# Patient Record
Sex: FEMALE | Race: WHITE | Employment: OTHER | URBAN - METROPOLITAN AREA
[De-identification: names, ages, dates, MRNs, and addresses within clinical notes are randomized per-mention and may not be internally consistent; named-entity substitution may affect disease eponyms.]

---

## 2018-05-25 ENCOUNTER — APPOINTMENT (OUTPATIENT)
Dept: CT IMAGING | Facility: CLINIC | Age: 68
End: 2018-05-25
Attending: EMERGENCY MEDICINE
Payer: MEDICARE

## 2018-05-25 ENCOUNTER — HOSPITAL ENCOUNTER (EMERGENCY)
Facility: CLINIC | Age: 68
Discharge: HOME | End: 2018-05-25
Attending: EMERGENCY MEDICINE | Admitting: EMERGENCY MEDICINE
Payer: MEDICARE

## 2018-05-25 VITALS
DIASTOLIC BLOOD PRESSURE: 78 MMHG | SYSTOLIC BLOOD PRESSURE: 152 MMHG | TEMPERATURE: 98.2 F | OXYGEN SATURATION: 97 % | HEART RATE: 63 BPM | RESPIRATION RATE: 16 BRPM | WEIGHT: 229 LBS

## 2018-05-25 DIAGNOSIS — H81.12 BENIGN PAROXYSMAL POSITIONAL VERTIGO OF LEFT EAR: ICD-10-CM

## 2018-05-25 LAB
ALBUMIN UR-MCNC: NEGATIVE MG/DL
ANION GAP SERPL CALCULATED.3IONS-SCNC: 5 MMOL/L (ref 3–14)
APPEARANCE UR: CLEAR
BACTERIA #/AREA URNS HPF: ABNORMAL /HPF
BASOPHILS # BLD AUTO: 0 10E9/L (ref 0–0.2)
BASOPHILS NFR BLD AUTO: 0.3 %
BILIRUB UR QL STRIP: NEGATIVE
BUN SERPL-MCNC: 17 MG/DL (ref 7–30)
CALCIUM SERPL-MCNC: 8.6 MG/DL (ref 8.5–10.1)
CHLORIDE SERPL-SCNC: 107 MMOL/L (ref 94–109)
CO2 SERPL-SCNC: 29 MMOL/L (ref 20–32)
COLOR UR AUTO: ABNORMAL
CREAT SERPL-MCNC: 0.68 MG/DL (ref 0.52–1.04)
DIFFERENTIAL METHOD BLD: NORMAL
EOSINOPHIL # BLD AUTO: 0.2 10E9/L (ref 0–0.7)
EOSINOPHIL NFR BLD AUTO: 1.4 %
ERYTHROCYTE [DISTWIDTH] IN BLOOD BY AUTOMATED COUNT: 14.6 % (ref 10–15)
GFR SERPL CREATININE-BSD FRML MDRD: 86 ML/MIN/1.7M2
GLUCOSE SERPL-MCNC: 110 MG/DL (ref 70–99)
GLUCOSE UR STRIP-MCNC: NEGATIVE MG/DL
HCT VFR BLD AUTO: 43.6 % (ref 35–47)
HGB BLD-MCNC: 13.8 G/DL (ref 11.7–15.7)
HGB UR QL STRIP: NEGATIVE
IMM GRANULOCYTES # BLD: 0 10E9/L (ref 0–0.4)
IMM GRANULOCYTES NFR BLD: 0.3 %
INTERPRETATION ECG - MUSE: NORMAL
KETONES UR STRIP-MCNC: NEGATIVE MG/DL
LEUKOCYTE ESTERASE UR QL STRIP: ABNORMAL
LYMPHOCYTES # BLD AUTO: 3.2 10E9/L (ref 0.8–5.3)
LYMPHOCYTES NFR BLD AUTO: 30.3 %
MCH RBC QN AUTO: 27.7 PG (ref 26.5–33)
MCHC RBC AUTO-ENTMCNC: 31.7 G/DL (ref 31.5–36.5)
MCV RBC AUTO: 88 FL (ref 78–100)
MONOCYTES # BLD AUTO: 0.7 10E9/L (ref 0–1.3)
MONOCYTES NFR BLD AUTO: 6.3 %
MUCOUS THREADS #/AREA URNS LPF: PRESENT /LPF
NEUTROPHILS # BLD AUTO: 6.4 10E9/L (ref 1.6–8.3)
NEUTROPHILS NFR BLD AUTO: 61.4 %
NITRATE UR QL: NEGATIVE
NRBC # BLD AUTO: 0 10*3/UL
NRBC BLD AUTO-RTO: 0 /100
PH UR STRIP: 7 PH (ref 5–7)
PLATELET # BLD AUTO: 200 10E9/L (ref 150–450)
POTASSIUM SERPL-SCNC: 4.9 MMOL/L (ref 3.4–5.3)
RBC # BLD AUTO: 4.98 10E12/L (ref 3.8–5.2)
RBC #/AREA URNS AUTO: <1 /HPF (ref 0–2)
SODIUM SERPL-SCNC: 141 MMOL/L (ref 133–144)
SOURCE: ABNORMAL
SP GR UR STRIP: 1 (ref 1–1.03)
SQUAMOUS #/AREA URNS AUTO: 1 /HPF (ref 0–1)
UROBILINOGEN UR STRIP-MCNC: NORMAL MG/DL (ref 0–2)
WBC # BLD AUTO: 10.4 10E9/L (ref 4–11)
WBC #/AREA URNS AUTO: 4 /HPF (ref 0–5)

## 2018-05-25 PROCEDURE — 25000128 H RX IP 250 OP 636: Performed by: EMERGENCY MEDICINE

## 2018-05-25 PROCEDURE — 87086 URINE CULTURE/COLONY COUNT: CPT | Performed by: EMERGENCY MEDICINE

## 2018-05-25 PROCEDURE — 85025 COMPLETE CBC W/AUTO DIFF WBC: CPT | Performed by: EMERGENCY MEDICINE

## 2018-05-25 PROCEDURE — 25000132 ZZH RX MED GY IP 250 OP 250 PS 637: Performed by: EMERGENCY MEDICINE

## 2018-05-25 PROCEDURE — 81001 URINALYSIS AUTO W/SCOPE: CPT | Performed by: EMERGENCY MEDICINE

## 2018-05-25 PROCEDURE — 80048 BASIC METABOLIC PNL TOTAL CA: CPT | Performed by: EMERGENCY MEDICINE

## 2018-05-25 PROCEDURE — 93005 ELECTROCARDIOGRAM TRACING: CPT

## 2018-05-25 PROCEDURE — 70450 CT HEAD/BRAIN W/O DYE: CPT

## 2018-05-25 PROCEDURE — 96360 HYDRATION IV INFUSION INIT: CPT

## 2018-05-25 PROCEDURE — 51798 US URINE CAPACITY MEASURE: CPT

## 2018-05-25 PROCEDURE — 87186 SC STD MICRODIL/AGAR DIL: CPT | Performed by: EMERGENCY MEDICINE

## 2018-05-25 PROCEDURE — 99285 EMERGENCY DEPT VISIT HI MDM: CPT | Mod: 25

## 2018-05-25 PROCEDURE — 25000131 ZZH RX MED GY IP 250 OP 636 PS 637: Performed by: EMERGENCY MEDICINE

## 2018-05-25 PROCEDURE — 87088 URINE BACTERIA CULTURE: CPT | Performed by: EMERGENCY MEDICINE

## 2018-05-25 RX ORDER — MECLIZINE HYDROCHLORIDE 25 MG/1
25 TABLET ORAL ONCE
Status: COMPLETED | OUTPATIENT
Start: 2018-05-25 | End: 2018-05-25

## 2018-05-25 RX ORDER — LORAZEPAM 0.5 MG/1
0.5 TABLET ORAL ONCE
Status: COMPLETED | OUTPATIENT
Start: 2018-05-25 | End: 2018-05-25

## 2018-05-25 RX ORDER — MECLIZINE HYDROCHLORIDE 25 MG/1
25 TABLET ORAL EVERY 6 HOURS PRN
Qty: 30 TABLET | Refills: 1 | Status: SHIPPED | OUTPATIENT
Start: 2018-05-25

## 2018-05-25 RX ORDER — SODIUM CHLORIDE, SODIUM LACTATE, POTASSIUM CHLORIDE, CALCIUM CHLORIDE 600; 310; 30; 20 MG/100ML; MG/100ML; MG/100ML; MG/100ML
1000 INJECTION, SOLUTION INTRAVENOUS CONTINUOUS
Status: DISCONTINUED | OUTPATIENT
Start: 2018-05-25 | End: 2018-05-25 | Stop reason: HOSPADM

## 2018-05-25 RX ADMIN — LORAZEPAM 0.5 MG: 0.5 TABLET ORAL at 08:24

## 2018-05-25 RX ADMIN — MECLIZINE HYDROCHLORIDE 25 MG: 25 TABLET ORAL at 08:24

## 2018-05-25 RX ADMIN — SODIUM CHLORIDE, POTASSIUM CHLORIDE, SODIUM LACTATE AND CALCIUM CHLORIDE 1000 ML: 600; 310; 30; 20 INJECTION, SOLUTION INTRAVENOUS at 08:25

## 2018-05-25 ASSESSMENT — ENCOUNTER SYMPTOMS
TROUBLE SWALLOWING: 0
FREQUENCY: 1
WEAKNESS: 0
HEADACHES: 1
FEVER: 0
NUMBNESS: 0
DIZZINESS: 1
GASTROINTESTINAL NEGATIVE: 1
DYSURIA: 1
SPEECH DIFFICULTY: 0

## 2018-05-25 NOTE — ED PROVIDER NOTES
History     Chief Complaint:  Dizziness     HPI   History obtained through granddaughter acting as informal .  Lorraine Guillaume is a 68 year old female who presents with granddaughter for evaluation of dizziness. The patient reports a four day history of dysuria and urinary frequency. Today when she awoke around 0600 she reports acute onset of vertiginous room-spinning dizziness as well as an posterior headache. She notes some mild gait difficulty due to imbalance with this. Dizziness is worse with changing positions or turning to the left. No speech or swallowing difficulties. The patient denies any visual deficit, diplopia, fevers, focal numbness or weakness, or any other acute symptoms.     Of note, she reports a history of similar dizziness many years ago for which she was not worked up, and also reports a stress-related stroke in 1999 which was also not worked up and from which she has no residual effects.       Allergies:  Morphine  Penicillins    Medications:    The patient is currently on no regular medications.    Past Medical History:    CVA in 1999, per patient    Past Surgical History:    Appendectomy     Family History:    History reviewed. No pertinent family history.      Social History:  The patient denies tobacco, alcohol, or drug use.     Here with granddaughter, acting as informal .        Review of Systems   Constitutional: Negative for fever.   HENT: Negative for trouble swallowing.    Eyes: Negative for visual disturbance.   Gastrointestinal: Negative.    Genitourinary: Positive for dysuria and frequency.   Musculoskeletal: Positive for gait problem.   Neurological: Positive for dizziness and headaches. Negative for speech difficulty, weakness and numbness.   All other systems reviewed and are negative.      Physical Exam   First Vitals:  BP: 168/83  Pulse: 86  Heart Rate: 86  Temp: 98.2  F (36.8  C)  Resp: 14  Weight: 103.9 kg (229 lb)  SpO2: 99  %    Physical Exam  GENERAL: well developed. Unable to lay flat. Crying as I enter the room. Eyes closed.   HEAD: atraumatic  EYES: pupils reactive, extraocular muscles intact, conjunctivae normal  ENT:  mucus membranes moist  NECK:  trachea midline, normal range of motion  RESPIRATORY: no tachypnea, breath sounds clear to auscultation   CVS: normal S1/S2, no murmurs, intact distal pulses  ABDOMEN: soft, nontender, nondistention  MUSCULOSKELETAL: no deformities  SKIN: warm and dry, no acute rashes or ulceration  NEURO: GCS 15, cranial nerves intact, alert and oriented x3. Finger-nose-finger testing is slightly ataxic bilaterally.  PSYCH:  Mood/affect normal        Emergency Department Course   ECG (10:19:54):  Indication: dizziness   Rate 62 bpm. CO interval 164. QRS duration 84. QT/QTc 426/436. P-R-T axes 23, 13, 28.   Normal sinus rhythm  Possible anterior infarct, age undetermined  abnormal ECG  No previous ECGs available for comparison.  Interpreted at 1026 by Nomi Peañ MD.     Imaging:  Radiographic findings were communicated with the patient and family who voiced understanding of the findings.  CT Head w/o contrast:  No evidence of acute intracranial hemorrhage, mass, or herniation. Results per Radiology.      Laboratory:  Laboratory findings were communicated with the patient and family who voiced understanding of the findings.  CBC w/diff: WNL (WBC 10.4, Hgb 13.8, Plt 200)  CMP: Glu 110 (H), o/w WNL (Cr 0.68)  UA: trace leukocyte esterase, few bacteria, mucous present, 4 WBCs, o/w Negative   Urine culture: pending     Interventions:  0824: meclizine 25mg, PO  0824: lorazepam 0.5mg, PO  0825: lactated ringers bolus 1L, IV      Emergency Department Course:  Past medical records, nursing notes, and vitals reviewed.   0750: I performed an exam of the patient as documented above.    Peripheral IV access established. Blood drawn and sent. The above imaging study and labs were obtained. Results above.  The  patient was given the above interventions with improvement.    0900: I rechecked patient.    1030: I rechecked patient, who is much improved.     I discussed the treatment plan with the patient. She expressed understanding of this plan and consented to discharge. She will be discharged home with instructions for care and follow up. In addition, the patient will return to the emergency department if symptoms persist, worsen, if new symptoms arise or if there is any concern.  All questions were answered.      Impression & Plan    Medical Decision Making:   The patient presents with urinary symptoms as well as dizziness. The dizziness came on abruptly and is worse turning to the left. Certainly stroke, benign positional vertigo, amongst others were all considered. The patient seemed to be quite symptomatic with it. She was given 0.5mg of Ativan and meclizine. Repeat exam is improved. She has a normal neurologic exam. This did come on suddenly. Imaging was obtained as she has some slight posterior aspect headache, but again the history is difficult to gather as she was so symptomatic with the dizziness. Do not suspect stroke or dissection. Do not feel she needs an MRI at this time. The patient also having urinary symptoms. UA does not show any signs of infection. We did to a bladder scan which was normal. Will add on a culture but no findings on the UA that would warrant antibiotics or treatment at this time.     Diagnosis:    ICD-10-CM    1. Benign paroxysmal positional vertigo of left ear H81.12        Disposition:   discharged to home    Discharge Medications:  Discharge Medication List as of 5/25/2018 10:32 AM      START taking these medications    Details   meclizine (ANTIVERT) 25 MG tablet Take 1 tablet (25 mg) by mouth every 6 hours as needed for dizziness, Disp-30 tablet, R-1, Local Print             Scribe Disclosure:  Ruddy CARTER, am serving as a scribe at 7:47 AM on 5/25/2018 to document services  personally performed by Nomi Peña MD based on my observations and the provider's statements to me.    Ruddy Molina  5/25/2018   EMERGENCY DEPARTMENT      Nomi Peña MD  05/29/18 2007

## 2018-05-25 NOTE — ED NOTES
Bed: ED20  Expected date: 5/25/18  Expected time: 7:29 AM  Means of arrival:   Comments:  Triage

## 2018-05-25 NOTE — ED AVS SNAPSHOT
Emergency Department    [address] [place] [address]    Phone:  [phone]    Fax:  [phone]                                       Lorraine Guillaume   MRN: [id number]    Department:   Emergency Department   Date of Visit:  5/25/2018           After Visit Summary Signature Page     I have received my discharge instructions, and my questions have been answered. I have discussed any challenges I see with this plan with the nurse or doctor.    ..........................................................................................................................................  Patient/Patient Representative Signature      ..........................................................................................................................................  Patient Representative Print Name and Relationship to Patient    ..................................................               ................................................  Date                                            Time    ..........................................................................................................................................  Reviewed by Signature/Title    ...................................................              ..............................................  Date                                                            Time

## 2018-05-25 NOTE — ED AVS SNAPSHOT
Emergency Department    [address] [facility] [address]    Phone:  [phone]    Fax:  [phone]                                       Lorraine Guillaume   MRN: [id number]    Department:   Emergency Department   Date of Visit:  5/25/2018           Patient Information     Date Of Birth          1950        Your diagnoses for this visit were:     Benign paroxysmal positional vertigo of left ear        You were seen by Nomi Peña MD.      Follow-up Information     Follow up with your primary MD.        Discharge Instructions         Vértigo posicional paroxístico jerman     Bruner proveedor de atención médica puede moverle la felix de diferentes maneras para tratar bruner VPPB.     El vértigo posicional paroxístico jerman ( BPPV , por heather siglas en inglés) es un problema del oído interno. El oído interno contiene el sistema vestibular. Chelsie sistema es el que ayuda a mantener bruner equilibrio. El vértigo posicional paroxístico jerman causa gabrielle sensación de blanca vueltas. Es un problema común del sistema vestibular.   Qué es el sistema vestibular?  El sistema vestibular del oído está formado por partes muy pequeñas. Estas son, entre otras, el utrículo, el sáculo y los cristobal semicirculares. El utrículo es un órgano muy pequeño que contiene courtney de calcio. En algunas personas, los courtney se trasladan y entran en los cristobal semicirculares. Cuando ocurre esto, el sistema leyda de funcionar eleazar. Eso causa el vértigo posicional paroxístico jerman. [place] significa que es gabrielle afección que no pone en riesgo la chelle. Paroxístico quiere decir que se presenta de repente. Posicional se refiere a que se presenta cuando usted mueve bruner felix. El vértigo es gabrielle sensación de estar dando vueltas.   Cuáles son las causas del vértigo posicional paroxístico jerman?  Las causas incluyen lesiones de la felix o el ravinder. Otros problemas del sistema vestibular también pueden causar chelsie tipo de vértigo.  En muchos casos, la causa se desconoce.  Síntomas del vértigo posicional paroxístico jerman  Usted puede experimentar sensaciones repetitivas de estar dando vueltas (vértigo) El vértigo suele durar menos de un minuto. Algunos movimientos, norma rodar en la cama, pueden provocar vértigo.  Diagnóstico del vértigo posicional paroxístico jerman  Hays proveedor de atención médica primaria puede diagnosticar y tratarle esta afección. También puede consultar a un médico especialista en nariz, amandeepta y oído (otorrinolaringólogo). En algunos casos, es posible que tenga que consultar a un médico especialista en el sistema nervioso (neurólogo).  El proveedor de atención médica le preguntará sobre heather síntomas e historia clínica (antecedentes de chrystal). Chelsie médico le examinará. Es posible que le joann pruebas de audición y equilibrio. [place] parte del examen, hays proveedor de atención médica puede hacerle  hays felix y cuerpo de determinadas maneras. Si usted tiene vértigo posicional paroxístico jerman, esos movimientos pueden provocarle vértigo. Hays proveedor también buscará detectar movimientos anormales de heather ojos. Es posible que le joann otras pruebas para comprobar el estado de heather sistemas nervioso o vestibular.  Tratamiento del vértigo posicional paroxístico jerman  Hays proveedor de atención médica puede intentar  los courtney de calcio. Le hará  hays felix y ravinder de ciertas maneras. Chelsie tratamiento es seguro y generalmente funciona eleazar. También es posible que le pidan que majo estos movimientos en hays casa. Es posible que siga teniendo vértigo por algunas semanas. Hays proveedor de atención médica revisará de nuevo heather síntomas, generalmente en un mes. Es posible que el tratamiento incluya gabrielle fisioterapia especial. En casos infrecuentes, es posible que se necesite gabrielle cirugía si el vértigo posicional paroxístico jerman no se va.     Cuándo llamar al proveedor de atención médica  Llame enseguida a hays  proveedor de atención médica si tiene alguno de los siguientes síntomas:    Si tiene síntomas que no se alivian con el tratamiento    Los síntomas empeoran    Aparecen síntomas nuevos   Date Last Reviewed: 3/19/2015    7489-1648 [employer]. [address] [place] [address]. All rights reserved. This information is not intended as a substitute for professional medical care. Always follow your healthcare professional's instructions.          24 Hour Appointment Hotline       To make an appointment at any [facility], call [phone] ([phone]). If you don't have a family doctor or clinic, we will help you find one. [facility] are conveniently located to serve the needs of you and your family.             Review of your medicines      START taking        Dose / Directions Last dose taken    meclizine 25 MG tablet   Commonly known as:  ANTIVERT   Dose:  25 mg   Quantity:  30 tablet        Take 1 tablet (25 mg) by mouth every 6 hours as needed for dizziness   Refills:  1                Prescriptions were sent or printed at these locations (1 Prescription)                   Other Prescriptions                Printed at Department/Unit printer (1 of 1)         meclizine (ANTIVERT) 25 MG tablet                Procedures and tests performed during your visit     Basic metabolic panel    CBC with platelets differential    CT Head w/o Contrast    EKG 12 lead    UA reflex to Microscopic and Culture      Orders Needing Specimen Collection     None      Pending Results     Date and Time Order Name Status Description    5/25/2018 0742 EKG 12 lead Preliminary             Pending Culture Results     No orders found from 5/23/2018 to 5/26/2018.            Pending Results Instructions     If you had any lab results that were not finalized at the time of your Discharge, you can call the ED Lab Result RN at [phone]. You will be contacted by this team for any positive Lab results or  changes in treatment. The nurses are available 7 days a week from 10A to 6:30P.  You can leave a message 24 hours per day and they will return your call.        Test Results From Your Hospital Stay        5/25/2018  8:49 AM      Component Results     Component Value Ref Range & Units Status    WBC 10.4 4.0 - 11.0 10e9/L Final    RBC Count 4.98 3.8 - 5.2 10e12/L Final    Hemoglobin 13.8 11.7 - 15.7 g/dL Final    Hematocrit 43.6 35.0 - 47.0 % Final    MCV 88 78 - 100 fl Final    MCH 27.7 26.5 - 33.0 pg Final    MCHC 31.7 31.5 - 36.5 g/dL Final    RDW 14.6 10.0 - 15.0 % Final    Platelet Count 200 150 - 450 10e9/L Final    Diff Method Automated Method  Final    % Neutrophils 61.4 % Final    % Lymphocytes 30.3 % Final    % Monocytes 6.3 % Final    % Eosinophils 1.4 % Final    % Basophils 0.3 % Final    % Immature Granulocytes 0.3 % Final    Nucleated RBCs 0 0 /100 Final    Absolute Neutrophil 6.4 1.6 - 8.3 10e9/L Final    Absolute Lymphocytes 3.2 0.8 - 5.3 10e9/L Final    Absolute Monocytes 0.7 0.0 - 1.3 10e9/L Final    Absolute Eosinophils 0.2 0.0 - 0.7 10e9/L Final    Absolute Basophils 0.0 0.0 - 0.2 10e9/L Final    Abs Immature Granulocytes 0.0 0 - 0.4 10e9/L Final    Absolute Nucleated RBC 0.0  Final         5/25/2018  9:01 AM      Component Results     Component Value Ref Range & Units Status    Sodium 141 133 - 144 mmol/L Final    Potassium 4.9 3.4 - 5.3 mmol/L Final    Chloride 107 94 - 109 mmol/L Final    Carbon Dioxide 29 20 - 32 mmol/L Final    Anion Gap 5 3 - 14 mmol/L Final    Glucose 110 (H) 70 - 99 mg/dL Final    Urea Nitrogen 17 7 - 30 mg/dL Final    Creatinine 0.68 0.52 - 1.04 mg/dL Final    GFR Estimate 86 >60 mL/min/1.7m2 Final    Non  GFR Calc    GFR Estimate If Black >90 >60 mL/min/1.7m2 Final    African American GFR Calc    Calcium 8.6 8.5 - 10.1 mg/dL Final         5/25/2018  8:51 AM      Component Results     Component Value Ref Range & Units Status    Color Urine Straw  Final     Appearance Urine Clear  Final    Glucose Urine Negative NEG^Negative mg/dL Final    Bilirubin Urine Negative NEG^Negative Final    Ketones Urine Negative NEG^Negative mg/dL Final    Specific Gravity Urine 1.004 1.003 - 1.035 Final    Blood Urine Negative NEG^Negative Final    pH Urine 7.0 5.0 - 7.0 pH Final    Protein Albumin Urine Negative NEG^Negative mg/dL Final    Urobilinogen mg/dL Normal 0.0 - 2.0 mg/dL Final    Nitrite Urine Negative NEG^Negative Final    Leukocyte Esterase Urine Trace (A) NEG^Negative Final    Source Midstream Urine  Final    RBC Urine <1 0 - 2 /HPF Final    WBC Urine 4 0 - 5 /HPF Final    Bacteria Urine Few (A) NEG^Negative /HPF Final    Squamous Epithelial /HPF Urine 1 0 - 1 /HPF Final    Mucous Urine Present (A) NEG^Negative /LPF Final         5/25/2018 10:13 AM      Narrative     CT SCAN OF THE HEAD WITHOUT CONTRAST   5/25/2018 9:28 AM     HISTORY: Vertigo.     TECHNIQUE:  Axial images of the head and coronal reformations without  IV contrast material. Radiation dose for this scan was reduced using  automated exposure control, adjustment of the mA and/or kV according  to patient size, or iterative reconstruction technique.    COMPARISON: None.    FINDINGS: There is no evidence of intracranial hemorrhage, mass, acute  infarct or anomaly. The ventricles are normal in size, shape and  configuration. The brain parenchyma and subarachnoid spaces are  normal.     The visualized portions of the sinuses and mastoids appear normal. The  bony calvarium and bones of the skull base appear intact.         Impression     IMPRESSION: No evidence of acute intracranial hemorrhage, mass, or  herniation.      MALICK PÉREZ MD                Clinical Quality Measure: Blood Pressure Screening     Your blood pressure was checked while you were in the emergency department today. The last reading we obtained was  BP: 152/78 . Please read the guidelines below about what these numbers mean and what you should do  "about them.  If your systolic blood pressure (the top number) is less than 120 and your diastolic blood pressure (the bottom number) is less than 80, then your blood pressure is normal. There is nothing more that you need to do about it.  If your systolic blood pressure (the top number) is 120-139 or your diastolic blood pressure (the bottom number) is 80-89, your blood pressure may be higher than it should be. You should have your blood pressure rechecked within a year by a primary care provider.  If your systolic blood pressure (the top number) is 140 or greater or your diastolic blood pressure (the bottom number) is 90 or greater, you may have high blood pressure. High blood pressure is treatable, but if left untreated over time it can put you at risk for heart attack, stroke, or kidney failure. You should have your blood pressure rechecked by a primary care provider within the next 4 weeks.  If your provider in the emergency department today gave you specific instructions to follow-up with your doctor or provider even sooner than that, you should follow that instruction and not wait for up to 4 weeks for your follow-up visit.        Thank you for choosing [facility]       Thank you for choosing [facility] for your care. Our goal is always to provide you with excellent care. Hearing back from our patients is one way we can continue to improve our services. Please take a few minutes to complete the written survey that you may receive in the mail after you visit with us. Thank you!        [employer] Information     [email or web address] lets you send messages to your doctor, view your test results, renew your prescriptions, schedule appointments and more. To sign up, go to [email or web address] . Click on \"Log in\" on the left side of the screen, which will take you to the Welcome page. Then click on \"Sign up Now\" on the right side of the page.     You will be asked to enter the access code listed below, as well as some personal " information. Please follow the directions to create your username and password.     Your access code is: [email or web address]  Expires: 2018 10:32 AM     Your access code will  in 90 days. If you need help or a new code, please call your [place] clinic or [phone].        Care EveryWhere ID     This is your Care EveryWhere ID. This could be used by other organizations to access your [place] medical records  [id number]        Equal Access to Services     [facility] : Panda dumonto Soyusuf, waaxda luqadaha, qaybta kaalmada adeedyajanet, marcela chandler . So [facility] [phone].    ATENCIÓN: Si habla español, tiene a hays disposición servicios gratuitos de asistencia lingüística. Llame al [phone].    We comply with applicable federal civil rights laws and [place] laws. We do not discriminate on the basis of race, color, national origin, age, disability, sex, sexual orientation, or gender identity.            After Visit Summary       This is your record. Keep this with you and show to your community pharmacist(s) and doctor(s) at your next visit.

## 2018-05-25 NOTE — DISCHARGE INSTRUCTIONS
Vértigo posicional paroxístico jerman     Hays proveedor de atención médica puede moverle la felix de diferentes maneras para tratar hays VPPB.     El vértigo posicional paroxístico jerman ( BPPV , por heather siglas en inglés) es un problema del oído interno. El oído interno contiene el sistema vestibular. Chelsie sistema es el que ayuda a mantener hays equilibrio. El vértigo posicional paroxístico jerman causa gabrielle sensación de blanca vueltas. Es un problema común del sistema vestibular.   Qué es el sistema vestibular?  El sistema vestibular del oído está formado por partes muy pequeñas. Estas son, entre otras, el utrículo, el sáculo y los cristobal semicirculares. El utrículo es un órgano muy pequeño que contiene courtney de calcio. En algunas personas, los courtney se trasladan y entran en los cristobal semicirculares. Cuando ocurre esto, el sistema leyda de funcionar eleazar. Eso causa el vértigo posicional paroxístico jerman. [place] significa que es gabrielle afección que no pone en riesgo la chelle. Paroxístico quiere decir que se presenta de repente. Posicional se refiere a que se presenta cuando usted mueve hays felix. El vértigo es gabrielle sensación de estar dando vueltas.   Cuáles son las causas del vértigo posicional paroxístico jerman?  Las causas incluyen lesiones de la felix o el ravinder. Otros problemas del sistema vestibular también pueden causar chelsie tipo de vértigo. En muchos casos, la causa se desconoce.  Síntomas del vértigo posicional paroxístico jerman  Usted puede experimentar sensaciones repetitivas de estar dando vueltas (vértigo) El vértigo suele durar menos de un minuto. Algunos movimientos, norma rodar en la cama, pueden provocar vértigo.  Diagnóstico del vértigo posicional paroxístico jerman  Hays proveedor de atención médica primaria puede diagnosticar y tratarle esta afección. También puede consultar a un médico especialista en nariz, garganta y oído (otorrinolaringólogo). En algunos casos, es posible que tenga que  consultar a un médico especialista en el sistema nervioso (neurólogo).  El proveedor de atención médica le preguntará sobre heather síntomas e historia clínica (antecedentes de chrystal). Chelsie médico le examinará. Es posible que le joann pruebas de audición y equilibrio. Simin parte del examen, hays proveedor de atención médica puede hacerle  hays felix y cuerpo de determinadas maneras. Si usted tiene vértigo posicional paroxístico jerman, esos movimientos pueden provocarle vértigo. Hays proveedor también buscará detectar movimientos anormales de heather ojos. Es posible que le joann otras pruebas para comprobar el estado de heather sistemas nervioso o vestibular.  Tratamiento del vértigo posicional paroxístico jerman  Hays proveedor de atención médica puede intentar  los courtney de calcio. Le hará  hays felix y ravinder de ciertas maneras. Chelsie tratamiento es seguro y generalmente funciona eleazar. También es posible que le pidan que majo estos movimientos en hays casa. Es posible que siga teniendo vértigo por algunas semanas. Hays proveedor de atención médica revisará de nuevo heather síntomas, generalmente en un mes. Es posible que el tratamiento incluya gabrielle fisioterapia especial. En casos infrecuentes, es posible que se necesite gabrielle cirugía si el vértigo posicional paroxístico jerman no se va.     Cuándo llamar al proveedor de atención médica  Llame enseguida a hays proveedor de atención médica si tiene alguno de los siguientes síntomas:    Si tiene síntomas que no se alivian con el tratamiento    Los síntomas empeoran    Aparecen síntomas nuevos   Date Last Reviewed: 3/19/2015    [id number] [employer]. [address] [place] [address], [place] [address]. All rights reserved. This information is not intended as a substitute for professional medical care. Always follow your healthcare professional's instructions.

## 2018-05-27 ENCOUNTER — TELEPHONE (OUTPATIENT)
Dept: EMERGENCY MEDICINE | Facility: CLINIC | Age: 68
End: 2018-05-27

## 2018-05-27 DIAGNOSIS — N39.0 URINARY TRACT INFECTION: ICD-10-CM

## 2018-05-27 LAB
BACTERIA SPEC CULT: ABNORMAL
Lab: ABNORMAL
SPECIMEN SOURCE: ABNORMAL

## 2018-05-27 RX ORDER — SULFAMETHOXAZOLE/TRIMETHOPRIM 800-160 MG
1 TABLET ORAL 2 TIMES DAILY
Qty: 6 TABLET | Refills: 0 | Status: CANCELLED | OUTPATIENT
Start: 2018-05-27 | End: 2018-05-30

## 2018-05-27 NOTE — TELEPHONE ENCOUNTER
[facility] Emergency Department Lab result notification [Adult-Female]    [place] ED lab result protocol used  Urine Culture Protocol    Reason for call  Notify of lab results, assess symptoms,  review ED providers recommendations/discharge instructions (if necessary) and advise per ED lab result f/u protocol    Lab Result (including Rx patient on, if applicable)  Preliminary urine culture report on 05/27/2018 shows the presence of bacteria(s):  10,000 to 50,000 colonies/ml Escherichia coli  Emergency Dept discharge antibiotic: None  Date of Rx (If Applicable): NA  Recommendations per [place] ED Lab result protocol - Urine culture protocol.  Information table from ED Provider visit on 05/25/2018  ED diagnosis Benign paroxysmal positional vertigo of left ear   ED provider Nomi Peña MD   Symptoms reported at ED visit (Chief complaint, HPI) History obtained through granddaughter acting as informal .  Lorraine Guillaume is a 68 year old female who presents with granddaughter for evaluation of dizziness. The patient reports a four day history of dysuria and urinary frequency. Today when she awoke around 0600 she reports acute onset of vertiginous room-spinning dizziness as well as an posterior headache. She notes some mild gait difficulty due to imbalance with this. Dizziness is worse with changing positions or turning to the left. No speech or swallowing difficulties. The patient denies any visual deficit, diplopia, fevers, focal numbness or weakness, or any other acute symptoms.      Of note, she reports a history of similar dizziness many years ago for which she was not worked up, and also reports a stress-related stroke in 1999 which was also not worked up and from which she has no residual effects.    ED providers Impression and Plan (applicable information) The patient presents with urinary symptoms as well as dizziness. The dizziness came on abruptly and is worse turning to the  left. Certainly stroke, benign positional vertigo, amongst others were all considered. The patient seemed to be quite symptomatic with it. She was given 0.5mg of Ativan and meclizine. Repeat exam is improved. She has a normal neurologic exam. This did come on suddenly. Imaging was obtained as she has some slight posterior aspect headache, but again the history is difficult to gather as she was so symptomatic with the dizziness. Do not suspect stroke or dissection. Do not feel she needs an MRI at this time. The patient also having urinary symptoms. UA does not show any signs of infection. We did to a bladder scan which was normal. Will add on a culture but no findings on the UA that would warrant antibiotics or treatment at this time.    Significant Medical hx, if applicable Reviewed   Coumadin/Warfarin [Yes /No] no   Creatinine Level (mg/dl) 0.68   Creatinine clearance (ml/min), if applicable 125   Allergies Morphine, Penillins   Weight, if applicable 103.8 kg      RN Assessment (Patient s current Symptoms), include time called.  [Insert Left message here if message left]  At 1252 with  Left voicemail message requesting a call back to [phone] between 10 a.m. and 6:30 p.m., 7 days a week     Cecilia Jackson RN  [facility] RN  Lung Nodule and ED Lab Result F/u RN  Epic pool (ED late result f/u RN): [id number]  FV INCIDENTAL RADIOLOGY F/U NURSES: P [id number]  Ph# [phone]    Copy of Lab result   Preliminary Result   Exam Information   Exam Date Exam Time Accession # Results    5/25/18  7:45 AM [id number]    Component Results   Component Collected Lab   Specimen Description 05/25/2018  7:45 AM 75   Midstream Urine   Special Requests 05/25/2018  7:45 AM 75   Specimen received in preservative   Culture Micro (Abnormal) 05/25/2018  7:45 AM 75   10,000 to 50,000 colonies/mL   Escherichia coli   Susceptibility testing in progress

## 2018-05-28 RX ORDER — CEPHALEXIN 500 MG/1
500 CAPSULE ORAL 2 TIMES DAILY
Qty: 14 CAPSULE | Refills: 0 | Status: SHIPPED | OUTPATIENT
Start: 2018-05-28 | End: 2018-06-04

## 2018-05-28 NOTE — TELEPHONE ENCOUNTER
[facility] Emergency Department Lab result notification:    [place] ED lab result protocol used  Urine Culture Protocol    Reason for call  Notify of lab results, assess symptoms,  review ED providers recommendations/discharge instructions (if necessary) and advise per ED lab result f/u protocol    Lab Result  Final urine culture on 05/28/2018 shows the presence of bacteria(s): 10,000 to 50,000 colonies/ml Escherichia coli  [place] ED discharge antibiotic: None  As per  ED lab result protocol, treat per Urine culture protocol.  Information table from ED Provider visit on 05/25/2018  ED diagnosis Benign paroxysmal positional vertigo of left ear   ED provider Nomi Peña MD   Symptoms reported at ED visit (Chief complaint, HPI) History obtained through granddaughter acting as informal .  Lorraine Guillaume is a 68 year old female who presents with granddaughter for evaluation of dizziness. The patient reports a four day history of dysuria and urinary frequency. Today when she awoke around 0600 she reports acute onset of vertiginous room-spinning dizziness as well as an posterior headache. She notes some mild gait difficulty due to imbalance with this. Dizziness is worse with changing positions or turning to the left. No speech or swallowing difficulties. The patient denies any visual deficit, diplopia, fevers, focal numbness or weakness, or any other acute symptoms.       Of note, she reports a history of similar dizziness many years ago for which she was not worked up, and also reports a stress-related stroke in 1999 which was also not worked up and from which she has no residual effects.    ED providers Impression and Plan (applicable information) The patient presents with urinary symptoms as well as dizziness. The dizziness came on abruptly and is worse turning to the left. Certainly stroke, benign positional vertigo, amongst others were all considered. The patient seemed to be  quite symptomatic with it. She was given 0.5mg of Ativan and meclizine. Repeat exam is improved. She has a normal neurologic exam. This did come on suddenly. Imaging was obtained as she has some slight posterior aspect headache, but again the history is difficult to gather as she was so symptomatic with the dizziness. Do not suspect stroke or dissection. Do not feel she needs an MRI at this time. The patient also having urinary symptoms. UA does not show any signs of infection. We did to a bladder scan which was normal. Will add on a culture but no findings on the UA that would warrant antibiotics or treatment at this time.    Significant Medical hx, if applicable Reviewed   Coumadin/Warfarin [Yes /No] no   Creatinine Level (mg/dl) 0.68   Creatinine clearance (ml/min), if applicable 125   Allergies Morphine, Penillins   Weight, if applicable 103.8 kg       RN Assessment (Patient s current Symptoms), include time called.  [Insert Left message here if message left]  At 1538 With  Left voicemail message requesting a call back to [phone] between 10 a.m. and 6:00 p.m., 7 days a week. May leave a message 24/7, if no one available.     Cecilia Jackson, RN  [facility] RN  Lung Nodule and ED Lab Result F/u RN  Epic pool (ED late result f/u RN): [id number]  FV INCIDENTAL RADIOLOGY F/U NURSES: P [id number]  Ph# [phone]      Copy of Lab result   Exam Information   Exam Date Exam Time Accession # Results    5/25/18  7:45 AM [id number]    Component Results   Component Collected Lab   Specimen Description 05/25/2018  7:45    Midstream Urine   Special Requests 05/25/2018  7:45 AM 75   Specimen received in preservative   Culture Micro (Abnormal) 05/25/2018  7:45    10,000 to 50,000 colonies/mL   Escherichia coli      Culture & Susceptibility   ESCHERICHIA COLI   Antibiotic Interpretation Sensitivity Unit Method Status   AMPICILLIN Resistant >=32 ug/mL JACOB Final   AMPICILLIN/SULBACTAM  Intermediate 16 ug/mL JACOB Final   CEFAZOLIN Sensitive <=4 ug/mL JACOB Final   Comment: Cefazolin JACOB breakpoints are for the treatment of uncomplicated urinary tract   infections.  For the treatment of systemic infections, please contact the   laboratory for additional testing.   CEFEPIME Sensitive <=1 ug/mL JACOB Final   CEFOXITIN Sensitive 8 ug/mL JACOB Final   CEFTAZIDIME Sensitive <=1 ug/mL JACOB Final   CEFTRIAXONE Sensitive <=1 ug/mL JACOB Final   CIPROFLOXACIN Sensitive <=0.25 ug/mL JACOB Final   GENTAMICIN Sensitive <=1 ug/mL JACOB Final   LEVOFLOXACIN Sensitive <=0.12 ug/mL JACOB Final   NITROFURANTOIN Sensitive <=16 ug/mL JACOB Final   Piperacillin/Tazo Sensitive <=4 ug/mL JACOB Final   TOBRAMYCIN Sensitive <=1 ug/mL JACOB Final   Trimethoprim/Sulfa Resistant >=16/304 ug/mL JACOB Final

## 2018-08-31 ENCOUNTER — APPOINTMENT (OUTPATIENT)
Dept: GENERAL RADIOLOGY | Facility: CLINIC | Age: 68
End: 2018-08-31
Attending: INTERNAL MEDICINE
Payer: MEDICARE

## 2018-08-31 ENCOUNTER — HOSPITAL ENCOUNTER (EMERGENCY)
Facility: CLINIC | Age: 68
Discharge: HOME | End: 2018-08-31
Attending: INTERNAL MEDICINE | Admitting: INTERNAL MEDICINE
Payer: MEDICARE

## 2018-08-31 VITALS
OXYGEN SATURATION: 93 % | DIASTOLIC BLOOD PRESSURE: 78 MMHG | TEMPERATURE: 98 F | SYSTOLIC BLOOD PRESSURE: 134 MMHG | RESPIRATION RATE: 14 BRPM

## 2018-08-31 DIAGNOSIS — R35.0 URINARY FREQUENCY: ICD-10-CM

## 2018-08-31 DIAGNOSIS — R25.2 CRAMP OF LIMB: ICD-10-CM

## 2018-08-31 LAB
ALBUMIN SERPL-MCNC: 3.4 G/DL (ref 3.4–5)
ALP SERPL-CCNC: 100 U/L (ref 40–150)
ALT SERPL W P-5'-P-CCNC: 28 U/L (ref 0–50)
ANION GAP SERPL CALCULATED.3IONS-SCNC: 7 MMOL/L (ref 3–14)
AST SERPL W P-5'-P-CCNC: 16 U/L (ref 0–45)
BASOPHILS # BLD AUTO: 0.1 10E9/L (ref 0–0.2)
BASOPHILS NFR BLD AUTO: 0.5 %
BILIRUB SERPL-MCNC: 0.3 MG/DL (ref 0.2–1.3)
BUN SERPL-MCNC: 13 MG/DL (ref 7–30)
CALCIUM SERPL-MCNC: 8.3 MG/DL (ref 8.5–10.1)
CHLORIDE SERPL-SCNC: 105 MMOL/L (ref 94–109)
CO2 SERPL-SCNC: 28 MMOL/L (ref 20–32)
CREAT SERPL-MCNC: 0.69 MG/DL (ref 0.52–1.04)
DIFFERENTIAL METHOD BLD: ABNORMAL
EOSINOPHIL # BLD AUTO: 0.1 10E9/L (ref 0–0.7)
EOSINOPHIL NFR BLD AUTO: 0.9 %
ERYTHROCYTE [DISTWIDTH] IN BLOOD BY AUTOMATED COUNT: 14.6 % (ref 10–15)
GFR SERPL CREATININE-BSD FRML MDRD: 84 ML/MIN/1.7M2
GLUCOSE SERPL-MCNC: 91 MG/DL (ref 70–99)
HCT VFR BLD AUTO: 46.8 % (ref 35–47)
HGB BLD-MCNC: 14.3 G/DL (ref 11.7–15.7)
IMM GRANULOCYTES # BLD: 0 10E9/L (ref 0–0.4)
IMM GRANULOCYTES NFR BLD: 0.3 %
LYMPHOCYTES # BLD AUTO: 2.7 10E9/L (ref 0.8–5.3)
LYMPHOCYTES NFR BLD AUTO: 23.3 %
MCH RBC QN AUTO: 27.7 PG (ref 26.5–33)
MCHC RBC AUTO-ENTMCNC: 30.6 G/DL (ref 31.5–36.5)
MCV RBC AUTO: 91 FL (ref 78–100)
MONOCYTES # BLD AUTO: 0.6 10E9/L (ref 0–1.3)
MONOCYTES NFR BLD AUTO: 4.7 %
NEUTROPHILS # BLD AUTO: 8.3 10E9/L (ref 1.6–8.3)
NEUTROPHILS NFR BLD AUTO: 70.3 %
NRBC # BLD AUTO: 0 10*3/UL
NRBC BLD AUTO-RTO: 0 /100
NT-PROBNP SERPL-MCNC: 82 PG/ML (ref 0–900)
PLATELET # BLD AUTO: 213 10E9/L (ref 150–450)
POTASSIUM SERPL-SCNC: 3.9 MMOL/L (ref 3.4–5.3)
PROT SERPL-MCNC: 7.6 G/DL (ref 6.8–8.8)
RBC # BLD AUTO: 5.17 10E12/L (ref 3.8–5.2)
SODIUM SERPL-SCNC: 140 MMOL/L (ref 133–144)
TROPONIN I SERPL-MCNC: <0.015 UG/L (ref 0–0.04)
WBC # BLD AUTO: 11.7 10E9/L (ref 4–11)

## 2018-08-31 PROCEDURE — 83880 ASSAY OF NATRIURETIC PEPTIDE: CPT | Performed by: INTERNAL MEDICINE

## 2018-08-31 PROCEDURE — 99284 EMERGENCY DEPT VISIT MOD MDM: CPT | Mod: 25

## 2018-08-31 PROCEDURE — 71046 X-RAY EXAM CHEST 2 VIEWS: CPT

## 2018-08-31 PROCEDURE — 84484 ASSAY OF TROPONIN QUANT: CPT | Performed by: INTERNAL MEDICINE

## 2018-08-31 PROCEDURE — 85025 COMPLETE CBC W/AUTO DIFF WBC: CPT | Performed by: INTERNAL MEDICINE

## 2018-08-31 PROCEDURE — 80053 COMPREHEN METABOLIC PANEL: CPT | Performed by: INTERNAL MEDICINE

## 2018-08-31 ASSESSMENT — ENCOUNTER SYMPTOMS
LIGHT-HEADEDNESS: 1
FREQUENCY: 1
ABDOMINAL PAIN: 0

## 2018-08-31 ASSESSMENT — PAIN DESCRIPTION - DESCRIPTORS: DESCRIPTORS: ACHING

## 2018-08-31 NOTE — ED PROVIDER NOTES
History     Chief Complaint:  Frequency    HPI   HPI limited secondary to language barrier. HPI provided through interpretation by an online .    Lorraine Guillaume is a 68 year old female, with a history of vertigo, who presents with her daughter for evaluation of frequency. The patient reports she was going to go for a walk two days ago when she started experiencing frequency and the need to void every half hour, and a headache she describes as pressure. She notes experiencing lightheadedness and chest pain during the episodes of frequency. She denies any abdominal pain or chest pain currently and no swelling in her legs which she has been controlling with diet and compression stockings. She notes going to the clinic today and had her urine checked which showed no sign of infection. She reports she was in the hospital for chest pain in May and has gone to an urologist, who would not treat her until she saw her primary care provider to relieve her cramps.    Lab Results [facility] 2018  UA: Specific Gravity (<1.005) o/w Negative    Allergies:  Morphine  Penicillins   Ibuprofen    Medications:    Antivert    Past Medical History:    Vertigo    Past Surgical History:    Appendectomy   section    Family History:    History reviewed. No pertinent family history.     Social History:  Smoking status: Never  Alcohol use: No  The patient presents to the emergency department with her daughter.  Marital Status:   [4]     Review of Systems   Cardiovascular: Positive for chest pain. Negative for leg swelling.   Gastrointestinal: Negative for abdominal pain.   Genitourinary: Positive for frequency.   Neurological: Positive for light-headedness.   All other systems reviewed and are negative.    Physical Exam   Patient Vitals for the past 24 hrs:   BP Temp Temp src Heart Rate Resp SpO2   18 1615 154/80 - - 61 - 94 %   18 1600 139/77 - - 62 - 95 %   18 1545 - - -  60 - 97 %   08/31/18 1530 - - - 59 - 98 %   08/31/18 1525 - 98  F (36.7  C) Oral 59 10 97 %     Physical Exam   Constitutional: She is cooperative.   HENT:   Right Ear: Tympanic membrane normal.   Left Ear: Tympanic membrane normal.   Mouth/Throat: Oropharynx is clear and moist and mucous membranes are normal.   Eyes: Conjunctivae are normal.   Neck: Normal range of motion.   Cardiovascular: Regular rhythm and normal heart sounds.    Pulmonary/Chest: Effort normal and breath sounds normal.   Abdominal: Soft. Normal appearance and bowel sounds are normal. There is tenderness. There is no rebound and no guarding.   Mild, diffuse tenderness   Musculoskeletal: Normal range of motion. She exhibits no edema.   Lymphadenopathy:     She has no cervical adenopathy.   Neurological: She is alert.   Skin: Skin is warm and dry.   Psychiatric: She has a normal mood and affect.       Emergency Department Course   Imaging:  Radiographic findings were communicated with the patient and family who voiced understanding of the findings.    XR Chest 2 Views  IMPRESSION: No acute cardiopulmonary disease.  As read by Radiology.    Laboratory:  CBC: WBC 11.7 (H) o/w WNL (HGB 14.3, )  CMP: Calcium 8.3 (L) o/w WNL (Creatinine 0.69)    Troponin I (1529): <0.015  BNP: 82    Emergency Department Course:  Past medical records, nursing notes, and vitals reviewed.  1600: I performed an exam of the patient and obtained history, as documented above.     IV inserted and blood drawn.    The patient was sent for a chest x-ray while in the emergency department, findings above.    1735: I rechecked the patient. Explained findings to the patient and her daughter.    Findings and plan explained to the Patient and daughter. Patient discharged home with instructions regarding supportive care, medications, and reasons to return. The importance of close follow-up was reviewed.   Impression & Plan    Medical Decision Making:    Lorraine Guillaume is a 68  year old female emergency department for further evaluation of ongoing urinary symptoms.  Urinalysis today showed no signs of infection.  She has previously been seen by urology who felt that starting anticholinergic agents might exacerbate her muscle cramps.  She does not show any evidence of diabetes, electrolyte derangement, or renal insufficiency.  She mentions chronic swelling of the legs but there is no evidence of congestive heart failure or anemia.  She has had some chronic dyspnea.  No evidence of pulmonary infiltrate or acute coronary syndrome.  She indicates that nothing has been tried for her muscle cramps.  I think a trial of magnesium supplementation would be appropriate.  Her description sounds like it could be restless legs and she may benefit from treatment with neurologic agents.  She mentions placing her compression stockings at bedtime.  I wonder if this is causing increased intravascular fluid and exacerbating her nocturia.  I have asked her to try putting on her compression stockings in the evening instead.  She should follow-up in clinic next week.      Diagnosis:    ICD-10-CM   1. Urinary frequency R35.0   2. Cramp of limb R25.2     Disposition:  Discharged to home with instructions for follow up.    Discharge Medications:  New Prescriptions    MAGNESIUM CHLORIDE-CALCIUM  MG TBEC    Take 1 tablet by mouth At Bedtime     Rao Colin  8/31/2018   [facility] EMERGENCY DEPARTMENT  Scribe Disclosure:  I, Rao Colin, am serving as a scribe at 4:00 PM on 8/31/2018 to document services personally performed by Janett Salgado MD based on my observations and the provider's statements to me.      Janett Salgado MD  08/31/18 [id number]

## 2018-08-31 NOTE — DISCHARGE INSTRUCTIONS
Los Calambres En Las Piernas [Leg Cramps]  Un calambre o espasmo muscular es gabrielle brayden contracción de las fibras de un músculo. Chelsie problema puede presentarse en los pies, las pantorrillas o los muslos por la noche, cuando las piernas están elevadas. Si es muy prolongado, el espasmo puede volverse muy doloroso.  Los calambres pueden ocurrir por varias causas, norma dormir en gabrielle posición incómoda, un kimi muscular deficiente por falta de ejercicio y estiramientos, deshidratación, desequilibrio de electrolitos, diabetes, consumo de alcohol y ciertos medicamentos.  Cuidados En [place]:  1. Hemalatha abundantes líquidos lazaro el día, para prevenir la deshidratación.  2. Estire las piernas antes de acostarse.  3. Lleve gabrielle dieta con alimentos ricos en potasio, tales norma la fruta fresca (bananas, naranjas, melón anaranjado y samanta) y jugos de fruta (manzana, ciruelas, naranja, uva y hannah). Otros de los alimentos más ricos en potasio son los frijoles blancos, rojos y pintos, las rizwana horneadas, las espinacas crudas y ciertos productos de mar norma el bacalao, la platija (flounder), el hipogloso (halibut), el salmón y las vieiras (scallops).  4. Consulte con hays médico sobre la posibilidad de ulices suplementos minerales y vitamínicos que contengan magnesio y vitamina B-12, si aún no los está tomando. También podrían recetarle otros medicamentos.  Primeros Auxilios En Iain De Un Calambre Jeffery En La Pierna     Para calmar un dolor leve, pruebe a levantarse de la cama y caminar. Algunas personas sienten alivio con la aplicación de calor (ducha o baño caliente, almohadilla calefactora) y haciéndose masajes. Otras personas se sienten mejor aplicándose gabrielle compresa fría (hielo triturado o en cubitos dentro de gabrielle bolsa plástica envuelta en gabrielle toalla). Pruebe ambos métodos y use el que le dé mejores resultados lazaro 20 minutos a la vez.    Si el dolor es intenso, estirar el músculo que tiene el espasmo podría aliviarlo  rápidamente.    Si el espasmo le afecta el pie, es posible que se le flexionen los dedos hacia arriba o hacia abajo. Para estirar el músculo que tiene el espasmo, doble los dedos del pie en la dirección opuesta: si el espasmo rah de los dedos hacia arriba, dóblelos hacia abajo; si el espasmo rah de los dedos hacia abajo, dóblelos hacia arriba.    Si el espasmo le afecta la pantorrilla, flexione el tobillo de modo que el pie apunte hacia arriba en dirección de la rodilla.    Si el espasmo le afecta el muslo, flexione o enderece la rodilla y la cadera hasta sentir alivio.    Sostenga el estiramiento lazaro 30 segundos, relájese y descanse por un minuto; repita alison proceso hasta que se le alivie el espasmo.  Majo gabrielle visita de control a hays médico o alison centro si no ha empezado a mejorar en la próxima semana. Si el dolor le empeora cuando camina y le mejora cuando descansa, consulte con hays médico para que le majo otras pruebas, ya que esto podría ser señal de gabrielle darien circulación en la pierna.  Regrese Sin Demora  o llame al médico en cualquiera de los siguientes casos:    Tiene hinchazón, frío, coloración azulada, entumecimiento u hormigueo en los dedos de las cayden o de los pies    Se le debilita la pierna afectada    El dolor aumenta y no se puede controlar con las medidas descritas arriba  Date Last Reviewed: 11/23/2015 2000-2017 [employer]. [address] [place] [address], [place] [address]. Todos los derechos reservados. Esta información no pretende sustituir la atención médica profesional. Sólo hays médico puede diagnosticar y tratar un problema de chrystal.

## 2018-08-31 NOTE — ED AVS SNAPSHOT
[facility] Emergency Department    [address]    [facility] [address]    Phone:  [phone]    Fax:  [phone]                                       Lorraine Guillaume   MRN: [id number]    Department:  [facility] Emergency Department   Date of Visit:  8/31/2018           After Visit Summary Signature Page     I have received my discharge instructions, and my questions have been answered. I have discussed any challenges I see with this plan with the nurse or doctor.    ..........................................................................................................................................  Patient/Patient Representative Signature      ..........................................................................................................................................  Patient Representative Print Name and Relationship to Patient    ..................................................               ................................................  Date                                            Time    ..........................................................................................................................................  Reviewed by Signature/Title    ...................................................              ..............................................  Date                                                            Time          22EPIC Rev 08/18

## 2018-08-31 NOTE — ED AVS SNAPSHOT
[facility] Emergency Department    [address]    [facility] [address]    Phone:  [phone]    Fax:  [phone]                                       Lorraine Guillaume   MRN: [id number]    Department:  [facility] Emergency Department   Date of Visit:  8/31/2018           Patient Information     Date Of Birth          1950        Your diagnoses for this visit were:     Urinary frequency     Cramp of limb        You were seen by Janett Salgado MD.      Follow-up Information     Follow up with No Ref-Primary, Physician In 5 days.        Discharge Instructions         Los Calambres En Las Piernas [Leg Cramps]  Un calambre o espasmo muscular es gabrielle brayden contracción de las fibras de un músculo. Chelsie problema puede presentarse en los pies, las pantorrillas o los muslos por la noche, cuando las piernas están elevadas. Si es muy prolongado, el espasmo puede volverse muy doloroso.  Los calambres pueden ocurrir por varias causas, norma dormir en gabrielle posición incómoda, un kimi muscular deficiente por falta de ejercicio y estiramientos, deshidratación, desequilibrio de electrolitos, diabetes, consumo de alcohol y ciertos medicamentos.  Cuidados En La Falconer:  1. Hemalatha abundantes líquidos lazaro el día, para prevenir la deshidratación.  2. Estire las piernas antes de acostarse.  3. Lleve gabrielle dieta con alimentos ricos en potasio, tales norma la fruta fresca (bananas, naranjas, melón anaranjado y samanta) y jugos de fruta (manzana, ciruelas, naranja, uva y hannah). Otros de los alimentos más ricos en potasio son los frijoles blancos, rojos y pintos, las rizwana horneadas, las espinacas crudas y ciertos productos de mar norma el bacalao, la platija (flounder), el hipogloso (halibut), el salmón y las vieiras (scallops).  4. Consulte con hays médico sobre la posibilidad de ulices suplementos minerales y vitamínicos que contengan magnesio y vitamina B-12, si aún no los está tomando.  También podrían recetarle otros medicamentos.  Primeros Auxilios En Iain De Un Calambre Jeffery En La Pierna     Para calmar un dolor leve, pruebe a levantarse de la cama y caminar. Algunas personas sienten alivio con la aplicación de calor (ducha o baño caliente, almohadilla calefactora) y haciéndose masajes. Otras personas se sienten mejor aplicándose gabrielle compresa fría (hielo triturado o en cubitos dentro de gabrielle bolsa plástica envuelta en gabrielle toalla). Pruebe ambos métodos y use el que le dé mejores resultados lazaro 20 minutos a la vez.    Si el dolor es intenso, estirar el músculo que tiene el espasmo podría aliviarlo rápidamente.    Si el espasmo le afecta el pie, es posible que se le flexionen los dedos hacia arriba o hacia abajo. Para estirar el músculo que tiene el espasmo, doble los dedos del pie en la dirección opuesta: si el espasmo rah de los dedos hacia arriba, dóblelos hacia abajo; si el espasmo rah de los dedos hacia abajo, dóblelos hacia arriba.    Si el espasmo le afecta la pantorrilla, flexione el tobillo de modo que el pie apunte hacia arriba en dirección de la rodilla.    Si el espasmo le afecta el muslo, flexione o enderece la rodilla y la cadera hasta sentir alivio.    Sostenga el estiramiento lazaro 30 segundos, relájese y descanse por un minuto; repita alison proceso hasta que se le alivie el espasmo.  Majo gabrielle visita de control a hays médico o alison centro si no ha empezado a mejorar en la próxima semana. Si el dolor le empeora cuando camina y le mejora cuando descansa, consulte con hays médico para que le majo otras pruebas, ya que esto podría ser señal de gabrielle darien circulación en la pierna.  Regrese Sin Demora  o llame al médico en cualquiera de los siguientes casos:    Tiene hinchazón, frío, coloración azulada, entumecimiento u hormigueo en los dedos de las cayden o de los pies    Se le debilita la pierna afectada    El dolor aumenta y no se puede controlar con las medidas descritas arriba  Date  Last Reviewed: 11/23/2015 2000-2017 [employer]. [address] [place] [address], [place] [address]. Todos los derechos reservados. Esta información no pretende sustituir la atención médica profesional. Sólo hays médico puede diagnosticar y tratar un problema de chrystal.          24 Hour Appointment Hotline       To make an appointment at any [facility] call [phone] ([phone]). If you don't have a family doctor or clinic, we will help you find one. [facility] clinics are conveniently located to serve the needs of you and your family.             Review of your medicines      START taking        Dose / Directions Last dose taken    Magnesium Chloride-Calcium  MG Tbec   Dose:  1 tablet   Quantity:  30 tablet        Take 1 tablet by mouth At Bedtime   Refills:  0          Our records show that you are taking the medicines listed below. If these are incorrect, please call your family doctor or clinic.        Dose / Directions Last dose taken    meclizine 25 MG tablet   Commonly known as:  ANTIVERT   Dose:  25 mg   Quantity:  30 tablet        Take 1 tablet (25 mg) by mouth every 6 hours as needed for dizziness   Refills:  1                Prescriptions were sent or printed at these locations (1 Prescription)                   Other Prescriptions                Printed at Department/Unit printer (1 of 1)         Magnesium Chloride-Calcium  MG TBEC                Procedures and tests performed during your visit     BNP    CBC with platelets differential    Comprehensive metabolic panel    Troponin I    XR Chest 2 Views      Orders Needing Specimen Collection     None      Pending Results     Date and Time Order Name Status Description    8/31/2018 1614 XR Chest 2 Views Preliminary             Pending Culture Results     No orders found from 8/29/2018 to 9/1/2018.            Pending Results Instructions     If you had any lab results that were not finalized at the time of your Discharge, you  can call the ED Lab Result RN at [phone]. You will be contacted by this team for any positive Lab results or changes in treatment. The nurses are available 7 days a week from 10A to 6:30P.  You can leave a message 24 hours per day and they will return your call.        Test Results From Your Hospital Stay        8/31/2018  4:26 PM      Component Results     Component Value Ref Range & Units Status    WBC 11.7 (H) 4.0 - 11.0 10e9/L Final    RBC Count 5.17 3.8 - 5.2 10e12/L Final    Hemoglobin 14.3 11.7 - 15.7 g/dL Final    Hematocrit 46.8 35.0 - 47.0 % Final    MCV 91 78 - 100 fl Final    MCH 27.7 26.5 - 33.0 pg Final    MCHC 30.6 (L) 31.5 - 36.5 g/dL Final    RDW 14.6 10.0 - 15.0 % Final    Platelet Count 213 150 - 450 10e9/L Final    Diff Method Automated Method  Final    % Neutrophils 70.3 % Final    % Lymphocytes 23.3 % Final    % Monocytes 4.7 % Final    % Eosinophils 0.9 % Final    % Basophils 0.5 % Final    % Immature Granulocytes 0.3 % Final    Nucleated RBCs 0 0 /100 Final    Absolute Neutrophil 8.3 1.6 - 8.3 10e9/L Final    Absolute Lymphocytes 2.7 0.8 - 5.3 10e9/L Final    Absolute Monocytes 0.6 0.0 - 1.3 10e9/L Final    Absolute Eosinophils 0.1 0.0 - 0.7 10e9/L Final    Absolute Basophils 0.1 0.0 - 0.2 10e9/L Final    Abs Immature Granulocytes 0.0 0 - 0.4 10e9/L Final    Absolute Nucleated RBC 0.0  Final         8/31/2018  4:43 PM      Component Results     Component Value Ref Range & Units Status    Sodium 140 133 - 144 mmol/L Final    Potassium 3.9 3.4 - 5.3 mmol/L Final    Chloride 105 94 - 109 mmol/L Final    Carbon Dioxide 28 20 - 32 mmol/L Final    Anion Gap 7 3 - 14 mmol/L Final    Glucose 91 70 - 99 mg/dL Final    Urea Nitrogen 13 7 - 30 mg/dL Final    Creatinine 0.69 0.52 - 1.04 mg/dL Final    GFR Estimate 84 >60 mL/min/1.7m2 Final    Non  GFR Calc    GFR Estimate If Black >90 >60 mL/min/1.7m2 Final    African American GFR Calc    Calcium 8.3 (L) 8.5 - 10.1 mg/dL Final     Bilirubin Total 0.3 0.2 - 1.3 mg/dL Final    Albumin 3.4 3.4 - 5.0 g/dL Final    Protein Total 7.6 6.8 - 8.8 g/dL Final    Alkaline Phosphatase 100 40 - 150 U/L Final    ALT 28 0 - 50 U/L Final    AST 16 0 - 45 U/L Final         8/31/2018  4:43 PM      Component Results     Component Value Ref Range & Units Status    Troponin I ES <0.015 0.000 - 0.045 ug/L Final    The 99th percentile for upper reference range is 0.045 ug/L.  Troponin values   in the range of 0.045 - 0.120 ug/L may be associated with risks of adverse   clinical events.           8/31/2018  4:58 PM      Narrative     CHEST TWO VIEW   8/31/2018 4:55 PM     HISTORY: Chest and abdominal pain.     COMPARISON: None.        Impression     IMPRESSION: No acute cardiopulmonary disease.         8/31/2018  4:43 PM      Component Results     Component Value Ref Range & Units Status    N-Terminal Pro BNP Inpatient 82 0 - 900 pg/mL Final       Reference range shown and results flagged as abnormal are suggested inpatient   cut points for confirming diagnosis if CHF in an acute setting. Establishing a   baseline value for each individual patient is useful for follow-up. An   inpatient or emergency department NT-proPBNP <300 pg/mL effectively rules out   acute CHF, with 99% negative predictive value.  The outpatient non-acute reference range for ruling out CHF is:   0-125 pg/mL (age 18 to less than 75)   0-450 pg/mL (age 75 yrs and older)                  Clinical Quality Measure: Blood Pressure Screening     Your blood pressure was checked while you were in the emergency department today. The last reading we obtained was  BP: 154/80 . Please read the guidelines below about what these numbers mean and what you should do about them.  If your systolic blood pressure (the top number) is less than 120 and your diastolic blood pressure (the bottom number) is less than 80, then your blood pressure is normal. There is nothing more that you need to do about it.  If your  "systolic blood pressure (the top number) is 120-139 or your diastolic blood pressure (the bottom number) is 80-89, your blood pressure may be higher than it should be. You should have your blood pressure rechecked within a year by a primary care provider.  If your systolic blood pressure (the top number) is 140 or greater or your diastolic blood pressure (the bottom number) is 90 or greater, you may have high blood pressure. High blood pressure is treatable, but if left untreated over time it can put you at risk for heart attack, stroke, or kidney failure. You should have your blood pressure rechecked by a primary care provider within the next 4 weeks.  If your provider in the emergency department today gave you specific instructions to follow-up with your doctor or provider even sooner than that, you should follow that instruction and not wait for up to 4 weeks for your follow-up visit.        Thank you for choosing [facility]       Thank you for choosing [facility] for your care. Our goal is always to provide you with excellent care. Hearing back from our patients is one way we can continue to improve our services. Please take a few minutes to complete the written survey that you may receive in the mail after you visit with us. Thank you!        [employer] Information     [employer] lets you send messages to your doctor, view your test results, renew your prescriptions, schedule appointments and more. To sign up, go to [email or web address] . Click on \"Log in\" on the left side of the screen, which will take you to the Welcome page. Then click on \"Sign up Now\" on the right side of the page.     You will be asked to enter the access code listed below, as well as some personal information. Please follow the directions to create your username and password.     Your access code is: [email or web address]  Expires: 2018  6:13 PM     Your access code will  in 90 days. If you need help or a new code, please call your [facility] " [facility] or [phone].        Care EveryWhere ID     This is your Care EveryWhere ID. This could be used by other organizations to access your [facility] medical records  [id number]        Equal Access to Services     SHAYAN PIÑA : Panda Holm, wakevda luqadaha, qaybta kaalmada yoandy, marcela sheth. So [facility] [phone].    ATENCIÓN: Si habla español, tiene a hays disposición servicios gratuitos de asistencia lingüística. Llame al [phone].    We comply with applicable federal civil rights laws and [place] laws. We do not discriminate on the basis of race, color, national origin, age, disability, sex, sexual orientation, or gender identity.            After Visit Summary       This is your record. Keep this with you and show to your community pharmacist(s) and doctor(s) at your next visit.

## 2018-08-31 NOTE — ED NOTES
MD in to see patient and discuss diagnosis, test results, and discharge plan. Patient meets discharge criteria. Discussed AVS with patient with . Questions answered. Patient and family verbalized understanding. Patient and daughter reports being ready to go home. Patient discharged home with family by car with all necessary information.

## 2018-09-01 LAB — INTERPRETATION ECG - MUSE: NORMAL

## 2019-03-27 ENCOUNTER — HOSPITAL ENCOUNTER (EMERGENCY)
Facility: CLINIC | Age: 69
Discharge: HOME | End: 2019-03-27
Attending: EMERGENCY MEDICINE | Admitting: EMERGENCY MEDICINE
Payer: COMMERCIAL

## 2019-03-27 ENCOUNTER — APPOINTMENT (OUTPATIENT)
Dept: CT IMAGING | Facility: CLINIC | Age: 69
End: 2019-03-27
Attending: EMERGENCY MEDICINE
Payer: COMMERCIAL

## 2019-03-27 VITALS
HEART RATE: 66 BPM | SYSTOLIC BLOOD PRESSURE: 120 MMHG | RESPIRATION RATE: 16 BRPM | WEIGHT: 226 LBS | DIASTOLIC BLOOD PRESSURE: 88 MMHG | TEMPERATURE: 98 F | OXYGEN SATURATION: 96 %

## 2019-03-27 DIAGNOSIS — R10.84 ABDOMINAL PAIN, GENERALIZED: ICD-10-CM

## 2019-03-27 DIAGNOSIS — R35.0 URINARY FREQUENCY: ICD-10-CM

## 2019-03-27 DIAGNOSIS — R51.9 NONINTRACTABLE HEADACHE, UNSPECIFIED CHRONICITY PATTERN, UNSPECIFIED HEADACHE TYPE: ICD-10-CM

## 2019-03-27 DIAGNOSIS — M54.9 BILATERAL BACK PAIN, UNSPECIFIED BACK LOCATION, UNSPECIFIED CHRONICITY: ICD-10-CM

## 2019-03-27 LAB
ALBUMIN SERPL-MCNC: 3 G/DL (ref 3.4–5)
ALBUMIN UR-MCNC: NEGATIVE MG/DL
ALP SERPL-CCNC: 90 U/L (ref 40–150)
ALT SERPL W P-5'-P-CCNC: 20 U/L (ref 0–50)
ANION GAP SERPL CALCULATED.3IONS-SCNC: 6 MMOL/L (ref 3–14)
APPEARANCE UR: CLEAR
AST SERPL W P-5'-P-CCNC: 20 U/L (ref 0–45)
BASOPHILS # BLD AUTO: 0 10E9/L (ref 0–0.2)
BASOPHILS NFR BLD AUTO: 0.4 %
BILIRUB SERPL-MCNC: 0.4 MG/DL (ref 0.2–1.3)
BILIRUB UR QL STRIP: NEGATIVE
BUN SERPL-MCNC: 15 MG/DL (ref 7–30)
CALCIUM SERPL-MCNC: 8.2 MG/DL (ref 8.5–10.1)
CHLORIDE SERPL-SCNC: 108 MMOL/L (ref 94–109)
CO2 SERPL-SCNC: 28 MMOL/L (ref 20–32)
COLOR UR AUTO: YELLOW
CREAT SERPL-MCNC: 0.61 MG/DL (ref 0.52–1.04)
DIFFERENTIAL METHOD BLD: NORMAL
EOSINOPHIL # BLD AUTO: 0.1 10E9/L (ref 0–0.7)
EOSINOPHIL NFR BLD AUTO: 1.6 %
ERYTHROCYTE [DISTWIDTH] IN BLOOD BY AUTOMATED COUNT: 14.6 % (ref 10–15)
GFR SERPL CREATININE-BSD FRML MDRD: >90 ML/MIN/{1.73_M2}
GLUCOSE SERPL-MCNC: 91 MG/DL (ref 70–99)
GLUCOSE UR STRIP-MCNC: NEGATIVE MG/DL
HCT VFR BLD AUTO: 43.1 % (ref 35–47)
HGB BLD-MCNC: 13.6 G/DL (ref 11.7–15.7)
HGB UR QL STRIP: NEGATIVE
IMM GRANULOCYTES # BLD: 0 10E9/L (ref 0–0.4)
IMM GRANULOCYTES NFR BLD: 0.4 %
KETONES UR STRIP-MCNC: NEGATIVE MG/DL
LEUKOCYTE ESTERASE UR QL STRIP: NEGATIVE
LIPASE SERPL-CCNC: 82 U/L (ref 73–393)
LYMPHOCYTES # BLD AUTO: 2.6 10E9/L (ref 0.8–5.3)
LYMPHOCYTES NFR BLD AUTO: 30.7 %
MCH RBC QN AUTO: 27.6 PG (ref 26.5–33)
MCHC RBC AUTO-ENTMCNC: 31.6 G/DL (ref 31.5–36.5)
MCV RBC AUTO: 88 FL (ref 78–100)
MONOCYTES # BLD AUTO: 0.4 10E9/L (ref 0–1.3)
MONOCYTES NFR BLD AUTO: 4.8 %
NEUTROPHILS # BLD AUTO: 5.3 10E9/L (ref 1.6–8.3)
NEUTROPHILS NFR BLD AUTO: 62.1 %
NITRATE UR QL: NEGATIVE
NRBC # BLD AUTO: 0 10*3/UL
NRBC BLD AUTO-RTO: 0 /100
PH UR STRIP: 7 PH (ref 5–7)
PLATELET # BLD AUTO: 197 10E9/L (ref 150–450)
POTASSIUM SERPL-SCNC: 4.1 MMOL/L (ref 3.4–5.3)
PROT SERPL-MCNC: 6.8 G/DL (ref 6.8–8.8)
RBC # BLD AUTO: 4.92 10E12/L (ref 3.8–5.2)
SODIUM SERPL-SCNC: 142 MMOL/L (ref 133–144)
SOURCE: NORMAL
SP GR UR STRIP: 1.01 (ref 1–1.03)
UROBILINOGEN UR STRIP-MCNC: NORMAL MG/DL (ref 0–2)
WBC # BLD AUTO: 8.5 10E9/L (ref 4–11)

## 2019-03-27 PROCEDURE — 96374 THER/PROPH/DIAG INJ IV PUSH: CPT

## 2019-03-27 PROCEDURE — 81003 URINALYSIS AUTO W/O SCOPE: CPT | Performed by: EMERGENCY MEDICINE

## 2019-03-27 PROCEDURE — 96375 TX/PRO/DX INJ NEW DRUG ADDON: CPT

## 2019-03-27 PROCEDURE — 25000128 H RX IP 250 OP 636: Performed by: EMERGENCY MEDICINE

## 2019-03-27 PROCEDURE — 83690 ASSAY OF LIPASE: CPT | Performed by: EMERGENCY MEDICINE

## 2019-03-27 PROCEDURE — 85025 COMPLETE CBC W/AUTO DIFF WBC: CPT | Performed by: EMERGENCY MEDICINE

## 2019-03-27 PROCEDURE — 99284 EMERGENCY DEPT VISIT MOD MDM: CPT | Mod: 25

## 2019-03-27 PROCEDURE — 70450 CT HEAD/BRAIN W/O DYE: CPT

## 2019-03-27 PROCEDURE — 80053 COMPREHEN METABOLIC PANEL: CPT | Performed by: EMERGENCY MEDICINE

## 2019-03-27 PROCEDURE — 96361 HYDRATE IV INFUSION ADD-ON: CPT

## 2019-03-27 RX ORDER — DIPHENHYDRAMINE HYDROCHLORIDE 50 MG/ML
12.5 INJECTION INTRAMUSCULAR; INTRAVENOUS ONCE
Status: COMPLETED | OUTPATIENT
Start: 2019-03-27 | End: 2019-03-27

## 2019-03-27 RX ORDER — KETOROLAC TROMETHAMINE 15 MG/ML
15 INJECTION, SOLUTION INTRAMUSCULAR; INTRAVENOUS ONCE
Status: COMPLETED | OUTPATIENT
Start: 2019-03-27 | End: 2019-03-27

## 2019-03-27 RX ADMIN — SODIUM CHLORIDE 500 ML: 9 INJECTION, SOLUTION INTRAVENOUS at 10:03

## 2019-03-27 RX ADMIN — DIPHENHYDRAMINE HYDROCHLORIDE 12.5 MG: 50 INJECTION INTRAMUSCULAR; INTRAVENOUS at 10:02

## 2019-03-27 RX ADMIN — PROCHLORPERAZINE EDISYLATE 2.5 MG: 5 INJECTION INTRAMUSCULAR; INTRAVENOUS at 10:00

## 2019-03-27 RX ADMIN — KETOROLAC TROMETHAMINE 15 MG: 15 INJECTION, SOLUTION INTRAMUSCULAR; INTRAVENOUS at 10:01

## 2019-03-27 ASSESSMENT — ENCOUNTER SYMPTOMS
DIARRHEA: 0
FREQUENCY: 1
VOMITING: 0
SHORTNESS OF BREATH: 0
NECK PAIN: 1
BACK PAIN: 1
HEADACHES: 1
NAUSEA: 0

## 2019-03-27 NOTE — ED PROVIDER NOTES
History     Chief Complaint:  Urinary Frequency, Back Pain    HPI   A phone  was used obtain the below history as well as to explain diagnosis, plan of treatment, reasons to return to the emergency department and appropriate follow up.    Lorraine Smith is a 69 year old female with a history of hyperlipidemia and CVA with no residual symptoms who presents to the ED for evaluation of upper back pain and urinary frequency. The patient reports that she has had ongoing urinary frequency for the past week, and has been drinking cranberry juice, for this with some relief. She does have a history of urinary frequency, and has been seen by Dr. Benitez in Urology for this back in 2018. Yesterday, she additionally noted some right upper back pain that radiates up though the neck and head with urination. This has progressively worsened since then, so she ultimately presented to the ED for evaluation. Here in the ED, the patient adds that she has not had this pain in the past. She has not taken any medications for the pain. She denies any pelvic pain with urination. She has had some loose stools with general abdominal discomfort, though denies any diarrhea, nausea, or vomiting. The patient does not get headaches typically. She denies any current chest pain, shortness of breath, or other symptoms or concerns.    Allergies:  Morphine  Penicillins  Ibuprofen  Acetaminophen    Medications:    Meclizine  Aspirin  Albuterol  Crestor    Past Medical History:    Hyperlipidemia  CVA  Chronic pain    Past Surgical History:    Appendectomy   section    Family History:    No past pertinent family history.    Social History:  Marital Status:   [4]  Negative for tobacco use.  Negative for alcohol use.  Negative for drug use.     Review of Systems   Respiratory: Negative for shortness of breath.    Cardiovascular: Negative for chest pain.   Gastrointestinal: Negative for diarrhea, nausea and  vomiting.   Genitourinary: Positive for frequency. Negative for pelvic pain.   Musculoskeletal: Positive for back pain and neck pain.   Neurological: Positive for headaches.   All other systems reviewed and are negative.    Physical Exam     Patient Vitals for the past 24 hrs:   BP Temp Temp src Pulse Heart Rate Resp SpO2 Weight   03/27/19 1040 -- -- -- -- -- -- 96 % --   03/27/19 1030 130/70 -- -- 66 -- -- 93 % --   03/27/19 1024 119/72 -- -- 68 -- -- -- --   03/27/19 1005 146/79 -- -- 69 -- -- 94 % --   03/27/19 1000 129/65 -- -- 65 -- -- 96 % --   03/27/19 0906 166/81 98  F (36.7  C) Oral 74 74 16 94 % 102.5 kg (226 lb)     Physical Exam  VS: Reviewed per above  HENT: Mucous membranes moist  EYES: sclera anicteric  CV: Rate as noted, regular rhythm.   RESP: Effort normal. Breath sounds are normal bilaterally.  GI: mild generalized tenderness without rebound or guarding, not distended.  NEURO: GCS 15, cranial nerves II through XII are intact, 5 out of 5 strength in all 4 extremities, sensation is intact light touch in all 4 extremities  MSK: No deformity of the extremities.  Tenderness to light palpation of the bilateral paraspinal musculature without midline tenderness to palpation.  SKIN: Warm and dry    Emergency Department Course     Imaging:  Radiographic findings were communicated with the patient who voiced understanding of the findings.  CT Head without contrast:   No acute pathology. No bleed, mass, or infarcts are seen. No change, as per radiology.    Laboratory:  CBC: WBC: 8.5, HGB: 13.6, PLT: 197  CMP: Calcium 8.2 (L), Albumin 3.0 (L), o/w WNL (Creatinine: 0.61)  Lipase: 82    UA with reflex to Microscopic: All WNL    Interventions:  1000 Compazine 2.5 mg IV  1001 Toradol, 15 mg, IV injection  1002 Benadryl 12.5 mg IV  1003 NS 0.5L IV    Emergency Department Course:  Nursing notes and vitals reviewed. (3856) I performed an exam of the patient as documented above.     IV inserted. Medicine administered  as documented above. Blood drawn. This was sent to the lab for further testing, results above.    The patient provided a urine sample here in the emergency department. This was sent for laboratory testing, findings above.     The patient was sent for a Head CT while in the emergency department, findings above.     (0639) I rechecked the patient and discussed the results of her workup thus far.     Findings and plan explained to the Patient. Patient discharged home with instructions regarding supportive care, medications, and reasons to return. The importance of close follow-up was reviewed.    I personally reviewed the laboratory results with the Patient and answered all related questions prior to discharge.    Impression & Plan      Medical Decision Making:  Lorraine Smith is a 69 year old female who presents to the ER with multiple complaints including right upper back pain and headache that has been progressive since onset while urinating yesterday.  She also has generalized abdominal discomfort.  She had some dysuria last week that seemed to respond to cranberry juice.  Initial vital signs are unrevealing.  Focal neurological deficits, tenderness to light palpation of the entire paraspinal musculature of the thoracic and lumbar spine.  There is mild generalized abdominal tenderness without peritoneal signs.  I reviewed the chart and see that the patient saw urology August 2018 for urinary frequency for many years and their recommendation is behavioral modification at that time.  Patient's headache was not sudden in onset or maximal in onset to suggest occult aneurysmal bleed however she does comment that these headaches are new for her.  Given her age I did obtain a CT scan of the head.  This study revealed no acute intracranial process.  With respect to her generalized abdominal discomfort, urinalysis is without evidence of infection, lab work reveals no evidence of pancreatitis, hepatitis,  leukocytosis.  With respect to her back pain, there is no recent injury to suggest occult vertebral fracture.  Her pain is not midline rather spread throughout the entire back musculature extending from trapezius to lumbar region.  I suspect muscular strain at this time.  She was given a small dose of Benadryl, Toradol, Compazine, IV fluids.  On reassessment her discomfort throughout the body had resolved.  Abdomen was nontender.  She tolerated p.o.  Recommended follow-up in the clinic setting as well as with urology as needed for ongoing urinary frequency.  Close return precautions were discussed.    Diagnosis:    ICD-10-CM    1. Bilateral back pain, unspecified back location, unspecified chronicity M54.9    2. Nonintractable headache, unspecified chronicity pattern, unspecified headache type R51    3. Abdominal pain, generalized R10.84    4. Urinary frequency R35.0      Disposition:  discharged to home with her daughter    Scribe Disclosure:  I, Pamela Joyce, am serving as a scribe on 3/27/2019 at 9:23 AM to personally document services performed by Arun Johnson MD based on my observations and the provider's statements to me.     Pamela Joyce  3/27/2019    EMERGENCY DEPARTMENT       Arun Johnson MD  03/27/19 1375

## 2019-03-27 NOTE — ED NOTES
Bed: ED11  Expected date: 3/27/19  Expected time: 9:09 AM  Means of arrival:   Comments:  Triage

## 2019-03-27 NOTE — ED AVS SNAPSHOT
Emergency Department  [address] [place] [address]  Phone:  [phone]  Fax:  [phone]                                    Lorraine Smith   MRN: [id number]    Department:   Emergency Department   Date of Visit:  3/27/2019           After Visit Summary Signature Page    I have received my discharge instructions, and my questions have been answered. I have discussed any challenges I see with this plan with the nurse or doctor.    ..........................................................................................................................................  Patient/Patient Representative Signature      ..........................................................................................................................................  Patient Representative Print Name and Relationship to Patient    ..................................................               ................................................  Date                                   Time    ..........................................................................................................................................  Reviewed by Signature/Title    ...................................................              ..............................................  Date                                               Time          22EPIC Rev 08/18

## 2019-03-27 NOTE — DISCHARGE INSTRUCTIONS
Return to the ER for worsening symptoms.  Follow-up with urology as needed for ongoing urinary frequency.  You can take Tylenol and Benadryl as needed for headache/body aches.  Please follow-up in the primary care setting as well.    Discharge Instructions  Headache    You were seen today for a headache. Headaches may be caused by many different things such as muscle tension, sinus inflammation, anxiety and stress, having too little sleep, too much alcohol, some medical conditions or injury. You may have a migraine, which is caused by changes in the blood vessels in your head.  At this time your provider does not find that your headache is a sign of anything dangerous or life-threatening.  However, sometimes the signs of serious illness do not show up right away.      Generally, every Emergency Department visit should have a follow-up clinic visit with either a primary or a specialty clinic/provider. Please follow-up as instructed by your emergency provider today.    Return to the Emergency Department if:  You get a new fever of 100.4 F or higher.  Your headache gets much worse.  You get a stiff neck with your headache.  You get a new headache that is significantly different or worse than headaches you have had before.  You are vomiting (throwing up) and cannot keep food or water down.  You have blurry or double vision or other problems with your eyes.  You have a new weakness on one side of your body.  You have difficulty with balance which is new.  You or your family thinks you are confused.  You have a seizure.    What can I do to help myself?  Pain medications - You may take a pain medication such as Tylenol  (acetaminophen), Advil , Motrin  (ibuprofen) or Aleve  (naproxen).  Take a pain reliever as soon as you notice symptoms.  Starting medications as soon as you start to have symptoms may lessen the amount of pain you have.  Relaxing in a quiet, dark room may help.  Get enough sleep and eat meals  regularly.  You may need to watch for certain foods or other things which may trigger your headaches.  Keeping a journal of your headaches and possible triggers may help you and your primary provider to identify things which you should avoid which may be causing your headaches.  If you were given a prescription for medicine here today, be sure to read all of the information (including the package insert) that comes with your prescription.  This will include important information about the medicine, its side effects, and any warnings that you need to know about.  The pharmacist who fills the prescription can provide more information and answer questions you may have about the medicine.  If you have questions or concerns that the pharmacist cannot address, please call or return to the Emergency Department.   Remember that you can always come back to the Emergency Department if you are not able to see your regular provider in the amount of time listed above, if you get any new symptoms, or if there is anything that worries you.    Discharge Instructions  Back Pain  You were seen today for back pain. Back pain can have many causes, but most will get better without surgery or other specific treatment. Sometimes there is a herniated (?slipped?) disc. We do not usually do MRI scans to look for these right away, since most herniated discs will get better on their own with time.  Today, we did not find any evidence that your back pain was caused by a serious condition. However, sometimes symptoms develop over time and cannot be found during an emergency visit, so it is very important that you follow up with your primary provider.  Generally, every Emergency Department visit should have a follow-up clinic visit with either a primary or a specialty clinic/provider. Please follow-up as instructed by your emergency provider today.    Return to the Emergency Department if:  You develop a fever with your back pain.   You have  weakness or change in sensation in one or both legs.  You lose control of your bowels or bladder, or cannot empty your bladder (cannot pee).  Your pain gets much worse.     Follow-up with your provider:  Unless your pain has completely gone away, please make an appointment with your provider within one week. Most of the routine care for back pain is available in a clinic and not the Emergency Department. You may need further management of your back pain, such as more pain medication, imaging such as an X-ray or MRI, or physical therapy.    What can I do to help myself?  Remain Active -- People are often afraid that they will hurt their back further or delay recovery by remaining active, but this is one of the best things you can do for your back. In fact, staying in bed for a long time to rest is not recommended. Studies have shown that people with low back pain recover faster when they remain active. Movement helps to bring blood flow to the muscles and relieve muscle spasms as well as preventing loss of muscle strength.  Heat -- Using a heating pad can help with low back pain during the first few weeks. Do not sleep with a heating pad, as you can be burned.   Pain medications - You may take a pain medication such as Tylenol  (acetaminophen), Advil , Motrin  (ibuprofen) or Aleve  (naproxen).  If you were given a prescription for medicine here today, be sure to read all of the information (including the package insert) that comes with your prescription.  This will include important information about the medicine, its side effects, and any warnings that you need to know about.  The pharmacist who fills the prescription can provide more information and answer questions you may have about the medicine.  If you have questions or concerns that the pharmacist cannot address, please call or return to the Emergency Department.   Remember that you can always come back to the Emergency Department if you are not able to see  your regular provider in the amount of time listed above, if you get any new symptoms, or if there is anything that worries you.  Discharge Instructions  Abdominal Pain    Abdominal pain (belly pain) can be caused by many things. Your evaluation today does not show the exact cause for your pain. Your provider today has decided that it is unlikely your pain is due to a life threatening problem, or a problem requiring surgery or hospital admission. Sometimes those problems cannot be found right away, so it is very important that you follow up as directed.  Sometimes only the changes which occur over time allow the cause of your pain to be found.    Generally, every Emergency Department visit should have a follow-up clinic visit with either a primary or a specialty clinic/provider. Please follow-up as instructed by your emergency provider today. With abdominal pain, we often recommend very close follow-up, such as the following day.    ADULTS:  Return to the Emergency Department right away if:    You get an oral temperature above 102oF or as directed by your provider.  You have blood in your stools. This may be bright red or appear as black, tarry stools.    You keep vomiting (throwing up) or cannot drink liquids.  You see blood when you vomit.   You cannot have a bowel movement or you cannot pass gas.  Your stomach gets bloated or bigger.  Your skin or the whites of your eyes look yellow.  You faint.  You have bloody, frequent or painful urination (peeing).  You have new symptoms or anything that worries you.    CHILDREN:  Return to the Emergency Department right away if your child has any of the above-listed symptoms or the following:    Pushes your hand away or screams/cries when his/her belly is touched.  You notice your child is very fussy or weak.  Your child is very tired and is too tired to eat or drink.  Your child is dehydrated.  Signs of dehydration can be:  Significant change in the amount of wet  diapers/urine.  Your infant or child starts to have dry mouth and lips, or no saliva (spit) or tears.    PREGNANT WOMEN:  Return to the Emergency Department right away if you have any of the above-listed symptoms or the following:    You have bleeding, leaking fluid or passing tissue from the vagina.  You have worse pain or cramping, or pain in your shoulder or back.  You have vomiting that will not stop.  You have a temperature of 100oF or more.  Your baby is not moving as much as usual.  You faint.  You get a bad headache with or without eye problems and abdominal pain.  You have a seizure.  You have unusual discharge from your vagina and abdominal pain.    Abdominal pain is pretty common during pregnancy.  Your pain may or may not be related to your pregnancy. You should follow-up closely with your OB provider so they can evaluate you and your baby.  Until you follow-up with your regular provider, do the following:     Avoid sex and do not put anything in your vagina.  Drink clear fluids.  Only take medications approved by your provider.    MORE INFORMATION:    Appendicitis:  A possible cause of abdominal pain in any person who still has their appendix is acute appendicitis. Appendicitis is often hard to diagnose.  Testing does not always rule out early appendicitis or other causes of abdominal pain. Close follow-up with your provider and re-evaluations may be needed to figure out the reason for your abdominal pain.    Follow-up:  It is very important that you make an appointment with your clinic and go to the appointment.  If you do not follow-up with your primary provider, it may result in missing an important development which could result in permanent injury or disability and/or lasting pain.  If there is any problem keeping your appointment, call your provider or return to the Emergency Department.    Medications:  Take your medications as directed by your provider today.  Before using over-the-counter  "medications, ask your provider and make sure to take the medications as directed.  If you have any questions about medications, ask your provider.    Diet:  Resume your normal diet as much as possible, but do not eat fried, fatty or spicy foods while you have pain.  Do not drink alcohol or have caffeine.  Do not smoke tobacco.    Probiotics: If you have been given an antibiotic, you may want to also take a probiotic pill or eat yogurt with live cultures. Probiotics have \"good bacteria\" to help your intestines stay healthy. Studies have shown that probiotics help prevent diarrhea (loose stools) and other intestine problems (including C. diff infection) when you take antibiotics. You can buy these without a prescription in the pharmacy section of the store.     If you were given a prescription for medicine here today, be sure to read all of the information (including the package insert) that comes with your prescription.  This will include important information about the medicine, its side effects, and any warnings that you need to know about.  The pharmacist who fills the prescription can provide more information and answer questions you may have about the medicine.  If you have questions or concerns that the pharmacist cannot address, please call or return to the Emergency Department.       Remember that you can always come back to the Emergency Department if you are not able to see your regular provider in the amount of time listed above, if you get any new symptoms, or if there is anything that worries you.    "

## 2023-10-23 ENCOUNTER — DASHBOARD ENCOUNTERS (OUTPATIENT)
Age: 73
End: 2023-10-23

## 2023-10-23 ENCOUNTER — OFFICE VISIT (OUTPATIENT)
Dept: URBAN - METROPOLITAN AREA CLINIC 74 | Facility: CLINIC | Age: 73
End: 2023-10-23
Payer: COMMERCIAL

## 2023-10-23 ENCOUNTER — LAB OUTSIDE AN ENCOUNTER (OUTPATIENT)
Dept: URBAN - METROPOLITAN AREA CLINIC 74 | Facility: CLINIC | Age: 73
End: 2023-10-23

## 2023-10-23 VITALS
HEART RATE: 70 BPM | SYSTOLIC BLOOD PRESSURE: 157 MMHG | HEIGHT: 56 IN | OXYGEN SATURATION: 95 % | BODY MASS INDEX: 54.89 KG/M2 | WEIGHT: 244 LBS | DIASTOLIC BLOOD PRESSURE: 80 MMHG | TEMPERATURE: 97.5 F

## 2023-10-23 DIAGNOSIS — Z12.11 COLON CANCER SCREENING: ICD-10-CM

## 2023-10-23 DIAGNOSIS — R19.5 POSITIVE COLORECTAL CANCER SCREENING USING COLOGUARD TEST: ICD-10-CM

## 2023-10-23 PROCEDURE — 99203 OFFICE O/P NEW LOW 30 MIN: CPT | Performed by: PHYSICIAN ASSISTANT

## 2023-10-23 RX ORDER — POLYETHYLENE GLYCOL 3350 17 G/17G
AS DIRECTED POWDER, FOR SOLUTION ORAL
Qty: 238 G | Refills: 0 | OUTPATIENT
Start: 2023-10-23 | End: 2023-10-24

## 2023-10-23 NOTE — HPI-TODAY'S VISIT:
The patient is 73-year-old female Irish speaking with past medical history as noted below is presenting to our clinic today with recent Cologuard positive test to schedule colonoscopy. Last Colonoscopy in 2013. No history of colon polyps. No family history of colon polyps or colon cancer. Denies any GI issues today.   -- The patient denies dyspepsia, dysphagia, odynophagia, hemoptysis, hematemesis, nausea, vomiting, regurgitation, melena, constipation, diarrhea, abdominal pain, hematochezia, fever, chills, chest pain, SOB, or any other GI complaints today.  -- The patient denies ETOH, Tobacco, and Illicit drug use.  -- The patient is up to date with Flu, Pneumonia, Shingles, and COVID vaccine.  -- Admits to Naproxen occasionallu.

## 2023-11-22 ENCOUNTER — TELEPHONE ENCOUNTER (OUTPATIENT)
Dept: URBAN - METROPOLITAN AREA CLINIC 6 | Facility: CLINIC | Age: 73
End: 2023-11-22

## 2023-12-06 ENCOUNTER — OFFICE VISIT (OUTPATIENT)
Dept: URBAN - METROPOLITAN AREA MEDICAL CENTER 30 | Facility: MEDICAL CENTER | Age: 73
End: 2023-12-06
Payer: COMMERCIAL

## 2023-12-06 DIAGNOSIS — R19.5 ABNORMAL CONSISTENCY OF STOOL: ICD-10-CM

## 2023-12-06 DIAGNOSIS — D12.5 ADENOMA OF SIGMOID COLON: ICD-10-CM

## 2023-12-06 DIAGNOSIS — Z12.11 COLON CANCER SCREENING: ICD-10-CM

## 2023-12-06 PROCEDURE — 45385 COLONOSCOPY W/LESION REMOVAL: CPT | Performed by: INTERNAL MEDICINE

## 2023-12-20 PROBLEM — 90458007: Status: ACTIVE | Noted: 2023-12-20

## 2023-12-20 PROBLEM — 428054006: Status: ACTIVE | Noted: 2023-12-20

## 2023-12-20 PROBLEM — 708699002: Status: ACTIVE | Noted: 2023-12-20

## 2023-12-20 PROBLEM — 733657002: Status: ACTIVE | Noted: 2023-12-20

## 2024-01-04 ENCOUNTER — OFFICE VISIT (OUTPATIENT)
Dept: URBAN - METROPOLITAN AREA CLINIC 74 | Facility: CLINIC | Age: 74
End: 2024-01-04

## 2024-01-04 NOTE — HPI-TODAY'S VISIT:
The patient is 73-year-old female Japanese speaking with past medical history as noted below is presenting to our clinic today with recent Cologuard positive test to schedule colonoscopy. Last Colonoscopy in 2013. No history of colon polyps. No family history of colon polyps or colon cancer. Denies any GI issues today.   -- The patient denies dyspepsia, dysphagia, odynophagia, hemoptysis, hematemesis, nausea, vomiting, regurgitation, melena, constipation, diarrhea, abdominal pain, hematochezia, fever, chills, chest pain, SOB, or any other GI complaints today.  -- The patient denies ETOH, Tobacco, and Illicit drug use.  -- The patient is up to date with Flu, Pneumonia, Shingles, and COVID vaccine.  -- Admits to Naproxen occasionallu.  Today January 4, 2024 the patient returns for a follow-up visit, the patient was last seen by Linda Mcbride on October 23, 2023 with a positive Cologuard test for colon cancer screening.The patient's previous colonoscopy was performed in 2013, there was no history of colon polyps, family history of colon cancer or colon polyps.  The patient denies having any upper GI symptoms such as dysphagia, odynophagia, nausea, vomiting, heartburn.  Bowel movements were normal and there was no rectal bleeding. The patient had a colonoscopy on December 6, 2023, at the time she was found to have a 4 mm sigmoid sessile adenomatous polyp removed with a cold snare. The patient also had a few sigmoid diverticula and small nonbleeding internal hemorrhoids.  The patient will be due to get a colonoscopy for surveillance purposes in 5 years and should remain on a high-fiber diet.

## 2024-02-14 ENCOUNTER — COLON (OUTPATIENT)
Dept: URBAN - METROPOLITAN AREA MEDICAL CENTER 30 | Facility: MEDICAL CENTER | Age: 74
End: 2024-02-14

## 2024-03-13 ENCOUNTER — OV EP (OUTPATIENT)
Dept: URBAN - METROPOLITAN AREA CLINIC 74 | Facility: CLINIC | Age: 74
End: 2024-03-13